# Patient Record
Sex: FEMALE | Race: ASIAN | NOT HISPANIC OR LATINO | URBAN - METROPOLITAN AREA
[De-identification: names, ages, dates, MRNs, and addresses within clinical notes are randomized per-mention and may not be internally consistent; named-entity substitution may affect disease eponyms.]

---

## 2022-11-14 ENCOUNTER — EMERGENCY (EMERGENCY)
Facility: HOSPITAL | Age: 67
LOS: 1 days | Discharge: HOME | End: 2022-11-14
Attending: EMERGENCY MEDICINE
Payer: MEDICARE

## 2022-11-14 VITALS
RESPIRATION RATE: 18 BRPM | TEMPERATURE: 98 F | HEIGHT: 62 IN | DIASTOLIC BLOOD PRESSURE: 91 MMHG | OXYGEN SATURATION: 97 % | SYSTOLIC BLOOD PRESSURE: 156 MMHG | WEIGHT: 115.08 LBS | HEART RATE: 93 BPM

## 2022-11-14 LAB
ALBUMIN SERPL ELPH-MCNC: 4.7 G/DL — SIGNIFICANT CHANGE UP (ref 3.3–5)
ALP SERPL-CCNC: 56 U/L — SIGNIFICANT CHANGE UP (ref 40–120)
ALT FLD-CCNC: 28 U/L — SIGNIFICANT CHANGE UP (ref 10–45)
ANION GAP SERPL CALC-SCNC: 13 MMOL/L — SIGNIFICANT CHANGE UP (ref 5–17)
AST SERPL-CCNC: 20 U/L — SIGNIFICANT CHANGE UP (ref 10–40)
BASOPHILS # BLD AUTO: 0.01 K/UL — SIGNIFICANT CHANGE UP (ref 0–0.2)
BASOPHILS NFR BLD AUTO: 0.1 % — SIGNIFICANT CHANGE UP (ref 0–2)
BILIRUB SERPL-MCNC: 0.6 MG/DL — SIGNIFICANT CHANGE UP (ref 0.2–1.2)
BUN SERPL-MCNC: 20 MG/DL — SIGNIFICANT CHANGE UP (ref 7–23)
CALCIUM SERPL-MCNC: 9.6 MG/DL — SIGNIFICANT CHANGE UP (ref 8.4–10.5)
CHLORIDE SERPL-SCNC: 99 MMOL/L — SIGNIFICANT CHANGE UP (ref 96–108)
CO2 SERPL-SCNC: 27 MMOL/L — SIGNIFICANT CHANGE UP (ref 22–31)
CREAT SERPL-MCNC: 0.6 MG/DL — SIGNIFICANT CHANGE UP (ref 0.5–1.3)
EGFR: 97 ML/MIN/1.73M2 — SIGNIFICANT CHANGE UP
EOSINOPHIL # BLD AUTO: 0 K/UL — SIGNIFICANT CHANGE UP (ref 0–0.5)
EOSINOPHIL NFR BLD AUTO: 0 % — SIGNIFICANT CHANGE UP (ref 0–6)
GLUCOSE SERPL-MCNC: 165 MG/DL — HIGH (ref 70–99)
HCT VFR BLD CALC: 42.3 % — SIGNIFICANT CHANGE UP (ref 34.5–45)
HGB BLD-MCNC: 13.6 G/DL — SIGNIFICANT CHANGE UP (ref 11.5–15.5)
IMM GRANULOCYTES NFR BLD AUTO: 0.4 % — SIGNIFICANT CHANGE UP (ref 0–0.9)
LIDOCAIN IGE QN: 38 U/L — SIGNIFICANT CHANGE UP (ref 7–60)
LYMPHOCYTES # BLD AUTO: 0.71 K/UL — LOW (ref 1–3.3)
LYMPHOCYTES # BLD AUTO: 6.9 % — LOW (ref 13–44)
MCHC RBC-ENTMCNC: 29.4 PG — SIGNIFICANT CHANGE UP (ref 27–34)
MCHC RBC-ENTMCNC: 32.2 GM/DL — SIGNIFICANT CHANGE UP (ref 32–36)
MCV RBC AUTO: 91.6 FL — SIGNIFICANT CHANGE UP (ref 80–100)
MONOCYTES # BLD AUTO: 0.29 K/UL — SIGNIFICANT CHANGE UP (ref 0–0.9)
MONOCYTES NFR BLD AUTO: 2.8 % — SIGNIFICANT CHANGE UP (ref 2–14)
NEUTROPHILS # BLD AUTO: 9.23 K/UL — HIGH (ref 1.8–7.4)
NEUTROPHILS NFR BLD AUTO: 89.8 % — HIGH (ref 43–77)
NRBC # BLD: 0 /100 WBCS — SIGNIFICANT CHANGE UP (ref 0–0)
PLATELET # BLD AUTO: 297 K/UL — SIGNIFICANT CHANGE UP (ref 150–400)
POTASSIUM SERPL-MCNC: 4.1 MMOL/L — SIGNIFICANT CHANGE UP (ref 3.5–5.3)
POTASSIUM SERPL-SCNC: 4.1 MMOL/L — SIGNIFICANT CHANGE UP (ref 3.5–5.3)
PROT SERPL-MCNC: 8 G/DL — SIGNIFICANT CHANGE UP (ref 6–8.3)
RBC # BLD: 4.62 M/UL — SIGNIFICANT CHANGE UP (ref 3.8–5.2)
RBC # FLD: 13 % — SIGNIFICANT CHANGE UP (ref 10.3–14.5)
SODIUM SERPL-SCNC: 139 MMOL/L — SIGNIFICANT CHANGE UP (ref 135–145)
WBC # BLD: 10.28 K/UL — SIGNIFICANT CHANGE UP (ref 3.8–10.5)
WBC # FLD AUTO: 10.28 K/UL — SIGNIFICANT CHANGE UP (ref 3.8–10.5)

## 2022-11-14 PROCEDURE — 74177 CT ABD & PELVIS W/CONTRAST: CPT | Mod: 26,MA

## 2022-11-14 PROCEDURE — 71045 X-RAY EXAM CHEST 1 VIEW: CPT | Mod: 26

## 2022-11-14 PROCEDURE — 99284 EMERGENCY DEPT VISIT MOD MDM: CPT | Mod: FS

## 2022-11-14 NOTE — ED PROVIDER NOTE - PROGRESS NOTE DETAILS
Attending Evette:  pt remains feeling well, tolerated po ct shows colitis which was discussed, likely the cause of symptoms, plan for ct head still, if unremarkable, dc outpt fu . ear exam normal tm clear b/l

## 2022-11-14 NOTE — ED PROVIDER NOTE - OBJECTIVE STATEMENT
67-year-old female no significant past medical history presenting to the emergency department with chief complaint of vomiting.  Patient states that she has had 1 episode of vomiting per week for the past 3 weeks it seems to gone for a day.  Patient has decreased p.o. intake due to this.  She was also having a sensation of discomfort in her right ear described as fullness.  She has not any tenderness or abnormal sounds.  She denies abdominal pain.  Disagree with triage and fell.  She is not having any abdominal discomfort at this point time.  She has no known abnormal food intake recently.  Has not been on antibiotics.  Has recently traveled from [place].  She has not any unexplained weight loss or night sweats.  She is having any chest pain, shortness of breath.  Denies any vertigo.  Has not had any falling over to the side or imbalances.

## 2022-11-14 NOTE — ED PROVIDER NOTE - NS ED ROS FT
General: No fevers, chills, malaise  Head: No headache  Eyes: No blurry vision, double vision, pain with eye movement  ENT: No runny nose, pos r ear discomfort  Neck: No neck pain  Chest: No chest pain, sob, palpitations, wheezing  GI: No abd pain, pos n/v, no diarrhea, bloody stool  Extremities: No arthralgias, myalgias  Skin: No new rashes/lesions  Neuro: No headache, weakness, difficulty with ambulation

## 2022-11-14 NOTE — ED PROVIDER NOTE - RAPID ASSESSMENT
66 y/o F presents to the ED c/o N/V. Pt returned from [place] 3 weeks ago. Pt further reports R ear pain. No hx of abdominal surgeries. Denies any fever, abdominal pain or any other acute complaints. Pt is well appearing in triage.     Razia ELLISON) have documented this rapid assessment note under the dictation of Joseph Hernandez (PA)  which has been reviewed and affirmed to be accurate. Patient was seen as a QPA patient. The patient will be seen and further worked up in the main emergency department and their care will be completed by the main emergency department team along with a thorough physical exam. Receiving team will follow up on labs, analgesia, any clinical imaging, reassess and disposition as clinically indicated, all decisions regarding the progression of care will be made at their discretion. 66 y/o F presents to the ED c/o N/V. Pt returned from [place] 3 [place] ago. Pt further reports R ear pain. No hx of abdominal surgeries. Denies any fever, abdominal pain or any other acute complaints. Pt is well appearing in triage.     Razia ELLISON (Omid) have documented this rapid assessment note under the dictation of Joseph Hernandez (PA)  which has been reviewed and affirmed to be accurate. Patient was seen as a QPA patient. The patient will be seen and further worked up in the main emergency department and their care will be completed by the main emergency department team along with a thorough physical exam. Receiving team will follow up on labs, analgesia, any clinical imaging, reassess and disposition as clinically indicated, all decisions regarding the progression of care will be made at their discretion.  Joseph ELLISON, personally performed the service described in the documentation recorded by the scribe in my presence, and it accurately and completely records my words and actions.

## 2022-11-14 NOTE — ED PROVIDER NOTE - PATIENT PORTAL LINK FT
You can access the FollowMyHealth Patient Portal offered by [facility] by registering at the following website: [email or web address]. By joining [facility]’s FollowMyHealth portal, you will also be able to view your health information using other applications (apps) compatible with our system.

## 2022-11-14 NOTE — ED PROVIDER NOTE - ATTENDING CONTRIBUTION TO CARE
MD Alas:  patient seen and evaluated personally.   I agree with the History & Physical,  Impression & Plan other than what was detailed in my note.  MD Alas  67-year-old female no significant past medical history presenting to the emergency department with chief complaint of vomiting.  Patient states that she has had 1 episode of vomiting per week for the past 3 weeks it seems to gone for a day.  Patient has decreased p.o. intake due to this.  She was also having a sensation of discomfort in her right ear described as fullness.  She has not any tenderness or abnormal sounds.  She denies abdominal pain.  Disagree with triage and fell.  She is not having any abdominal discomfort at this point time.  She has no known abnormal food intake recently.  Has not been on antibiotics.  Has recently traveled from [place].  She has not any unexplained weight loss or night sweats.  She is having any chest pain, shortness of breath.  Denies any vertigo.  Has not had any falling over to the side or imbalances.  No unilateral weakness, slurred speech.  Patient is afebrile, vital stable.  Patient is well-appearing, nontoxic, pleasant, smiling, heart sounds with no murmurs rubs or gallops, lungs are clear to auscultation bilaterally, no abdominal tenderness and no abdominal pain at this point time.  Appears very well-hydrated with moist mucous membranes, she has a normal neurological exam prior to 5x4, cranial nerves II through XII grossly intact.  Her finger-nose is normal.  Rapid alternating movements is normal.  Patient was acute black however given the fact that she is having ear complaints with intermittent vomiting over the weeks we will get CT head to screen for mass.  If this is negative likely to have follow-up with primary doctor.  Must perform ear exam as well.

## 2022-11-14 NOTE — ED PROVIDER NOTE - CLINICAL SUMMARY MEDICAL DECISION MAKING FREE TEXT BOX
Attending Evette:  67-year-old female no significant past medical history presenting to the emergency department with chief complaint of vomiting.  Patient states that she has had 1 episode of vomiting per week for the past 3 weeks it seems to gone for a day.  Patient has decreased p.o. intake due to this.  She was also having a sensation of discomfort in her right ear described as fullness.  She has not any tenderness or abnormal sounds.  She denies abdominal pain.  Disagree with triage and fell.  She is not having any abdominal discomfort at this point time.  She has no known abnormal food intake recently.  Has not been on antibiotics.  Has recently traveled from [place].  She has not any unexplained weight loss or night sweats.  She is having any chest pain, shortness of breath.  Denies any vertigo.  Has not had any falling over to the side or imbalances.  No unilateral weakness, slurred speech.  Patient is afebrile, vital stable.  Patient is well-appearing, nontoxic, pleasant, smiling, heart sounds with no murmurs rubs or gallops, lungs are clear to auscultation bilaterally, no abdominal tenderness and no abdominal pain at this point time.  Appears very well-hydrated with moist mucous membranes, she has a normal neurological exam prior to 5x4, cranial nerves II through XII grossly intact.  Her finger-nose is normal.  Rapid alternating movements is normal.  Patient was acute black however given the fact that she is having ear complaints with intermittent vomiting over the weeks we will get CT head to screen for mass.  If this is negative likely to have follow-up with primary doctor.  Must perform ear exam as well.

## 2022-11-14 NOTE — ED PROVIDER NOTE - NSFOLLOWUPINSTRUCTIONS_ED_ALL_ED_FT
Nausea / Vomiting    Nausea is the feeling that you have to vomit. As nausea gets worse, it can lead to vomiting. Vomiting puts you at an increased risk for dehydration. Older adults and people with other diseases or a weak immune system are at higher risk for dehydration. Drink clear fluids in small but frequent amounts as tolerated. Eat bland, easy-to-digest foods in small amounts as tolerated.    SEEK IMMEDIATE MEDICAL CARE IF YOU HAVE ANY OF THE FOLLOWING SYMPTOMS: fever, inability to keep sufficient fluids down, black or bloody vomitus, black or bloody stools, lightheadedness/dizziness, chest pain, severe headache, rash, shortness of breath, cold or clammy skin, confusion, pain with urination, or any signs of dehydration.    Take zofran as needed for nausea and vomiting

## 2022-11-15 VITALS
DIASTOLIC BLOOD PRESSURE: 81 MMHG | RESPIRATION RATE: 16 BRPM | SYSTOLIC BLOOD PRESSURE: 163 MMHG | OXYGEN SATURATION: 96 % | TEMPERATURE: 98 F | HEART RATE: 82 BPM

## 2022-11-15 LAB
APPEARANCE UR: CLEAR — SIGNIFICANT CHANGE UP
BILIRUB UR-MCNC: NEGATIVE — SIGNIFICANT CHANGE UP
COLOR SPEC: SIGNIFICANT CHANGE UP
DIFF PNL FLD: NEGATIVE — SIGNIFICANT CHANGE UP
GLUCOSE UR QL: ABNORMAL
KETONES UR-MCNC: ABNORMAL
LEUKOCYTE ESTERASE UR-ACNC: NEGATIVE — SIGNIFICANT CHANGE UP
NITRITE UR-MCNC: NEGATIVE — SIGNIFICANT CHANGE UP
PH UR: 6.5 — SIGNIFICANT CHANGE UP (ref 5–8)
PROT UR-MCNC: NEGATIVE — SIGNIFICANT CHANGE UP
SARS-COV-2 RNA SPEC QL NAA+PROBE: SIGNIFICANT CHANGE UP
SP GR SPEC: 1.04 — HIGH (ref 1.01–1.02)
UROBILINOGEN FLD QL: NEGATIVE — SIGNIFICANT CHANGE UP

## 2022-11-15 PROCEDURE — U0003: CPT

## 2022-11-15 PROCEDURE — 81003 URINALYSIS AUTO W/O SCOPE: CPT

## 2022-11-15 PROCEDURE — 83690 ASSAY OF LIPASE: CPT

## 2022-11-15 PROCEDURE — 71045 X-RAY EXAM CHEST 1 VIEW: CPT

## 2022-11-15 PROCEDURE — 80053 COMPREHEN METABOLIC PANEL: CPT

## 2022-11-15 PROCEDURE — 70450 CT HEAD/BRAIN W/O DYE: CPT | Mod: 26,MA

## 2022-11-15 PROCEDURE — 99284 EMERGENCY DEPT VISIT MOD MDM: CPT | Mod: 25

## 2022-11-15 PROCEDURE — U0005: CPT

## 2022-11-15 PROCEDURE — 74177 CT ABD & PELVIS W/CONTRAST: CPT | Mod: MA

## 2022-11-15 PROCEDURE — 70450 CT HEAD/BRAIN W/O DYE: CPT | Mod: MA

## 2022-11-15 PROCEDURE — 85025 COMPLETE CBC W/AUTO DIFF WBC: CPT

## 2022-11-15 RX ORDER — ONDANSETRON 8 MG/1
1 TABLET, FILM COATED ORAL
Qty: 10 | Refills: 0
Start: 2022-11-15

## 2022-11-15 RX ORDER — SODIUM CHLORIDE 9 MG/ML
1000 INJECTION INTRAMUSCULAR; INTRAVENOUS; SUBCUTANEOUS ONCE
Refills: 0 | Status: COMPLETED | OUTPATIENT
Start: 2022-11-15 | End: 2022-11-15

## 2022-11-15 RX ADMIN — SODIUM CHLORIDE 1000 MILLILITER(S): 9 INJECTION INTRAMUSCULAR; INTRAVENOUS; SUBCUTANEOUS at 01:36

## 2022-11-15 NOTE — ED POST DISCHARGE NOTE - RESULT SUMMARY
UA resulted after pt DC, small ketones, trace glucose, high sp gravity (AG on CMP was 13). as per note pt with no known PMHX, would advise on outpt PCP f/u to eval further for DM. -John Quintero PA-C

## 2022-11-15 NOTE — ED ADULT NURSE NOTE - OBJECTIVE STATEMENT
67y female PMH DM, HTN to the ED from home via triage c/o of vomiting. Pt reports approximately one episode of vomiting a week for the past 3 weeks. Pt also reports a pressure sensation in the right ear and reports decreased hearing in the right area. Pt is not having abdominal pain associated with the vomiting. Pt reports some decreased PO intake. Pt denies chest pain, palpitations, shortness of breath, visual disturbances, trouble with ambulation, numbness/tingling, fever, chills, constipation, diarrhea, or urinary symptoms. QDOC placed 18G in the LAC.  Stretcher in lowest position and locked, appropriate side rails in place, room cleared of clutter and safety hazards, blankets given for comfort.

## 2022-11-15 NOTE — ED ADULT NURSE NOTE - NSIMPLEMENTINTERV_GEN_ALL_ED
Implemented All Universal Safety Interventions:  [employer] to call system. Call bell, personal items and telephone within reach. Instruct patient to call for assistance. Room bathroom lighting operational. Non-slip footwear when patient is off stretcher. Physically safe environment: no spills, clutter or unnecessary equipment. Stretcher in lowest position, wheels locked, appropriate side rails in place.